# Patient Record
Sex: MALE | URBAN - METROPOLITAN AREA
[De-identification: names, ages, dates, MRNs, and addresses within clinical notes are randomized per-mention and may not be internally consistent; named-entity substitution may affect disease eponyms.]

---

## 2023-02-17 ENCOUNTER — OFFICE (OUTPATIENT)
Dept: URBAN - METROPOLITAN AREA CLINIC 105 | Facility: CLINIC | Age: 58
Setting detail: OPHTHALMOLOGY
End: 2023-02-17
Payer: COMMERCIAL

## 2023-02-17 DIAGNOSIS — H47.611: ICD-10-CM

## 2023-02-17 DIAGNOSIS — H53.001: ICD-10-CM

## 2023-02-17 DIAGNOSIS — H52.7: ICD-10-CM

## 2023-02-17 PROCEDURE — 92004 COMPRE OPH EXAM NEW PT 1/>: CPT | Performed by: OPHTHALMOLOGY

## 2023-02-17 PROCEDURE — 92015 DETERMINE REFRACTIVE STATE: CPT | Performed by: OPHTHALMOLOGY

## 2023-02-17 PROCEDURE — 92083 EXTENDED VISUAL FIELD XM: CPT | Performed by: OPHTHALMOLOGY

## 2023-02-17 ASSESSMENT — AXIALLENGTH_DERIVED
OS_AL: 4.86
OD_AL: 23.5849

## 2023-02-17 ASSESSMENT — REFRACTION_AUTOREFRACTION
OD_AXIS: 048
OS_AXIS: 163
OD_SPHERE: +3.25
OS_SPHERE: +0.75
OD_CYLINDER: -1.75
OS_CYLINDER: -1.25

## 2023-02-17 ASSESSMENT — REFRACTION_MANIFEST
OD_SPHERE: PLANO
OS_ADD: +2.25
OS_SPHERE: PLANO
OS_CYLINDER: SPHERE
OD_CYLINDER: SPHERE
OD_ADD: +2.25

## 2023-02-17 ASSESSMENT — KERATOMETRY
OS_K2POWER_DIOPTERS: 541.50
OS_AXISANGLE_DEGREES: 085
OD_K2POWER_DIOPTERS: 42.00
OS_K1POWER_DIOPTERS: 40.05
OD_K1POWER_DIOPTERS: 40.00
OD_AXISANGLE_DEGREES: 123

## 2023-02-17 ASSESSMENT — SPHEQUIV_DERIVED
OD_SPHEQUIV: 2.375
OS_SPHEQUIV: 0.125

## 2023-02-17 ASSESSMENT — VISUAL ACUITY
OD_BCVA: 20/20
OS_BCVA: 20/300

## 2023-02-17 ASSESSMENT — CONFRONTATIONAL VISUAL FIELD TEST (CVF)
OD_FINDINGS: CONSTRICTION
OS_FINDINGS: FULL

## 2023-04-03 ENCOUNTER — APPOINTMENT (OUTPATIENT)
Dept: ORTHOPEDIC SURGERY | Facility: CLINIC | Age: 58
End: 2023-04-03
Payer: MEDICARE

## 2023-04-03 VITALS — WEIGHT: 175 LBS | HEIGHT: 67 IN | BODY MASS INDEX: 27.47 KG/M2

## 2023-04-03 DIAGNOSIS — M16.10 UNILATERAL PRIMARY OSTEOARTHRITIS, UNSPECIFIED HIP: ICD-10-CM

## 2023-04-03 DIAGNOSIS — Z00.00 ENCOUNTER FOR GENERAL ADULT MEDICAL EXAMINATION W/OUT ABNORMAL FINDINGS: ICD-10-CM

## 2023-04-03 PROCEDURE — 99204 OFFICE O/P NEW MOD 45 MIN: CPT

## 2023-04-03 PROCEDURE — 73502 X-RAY EXAM HIP UNI 2-3 VIEWS: CPT

## 2023-04-09 PROBLEM — Z00.00 ENCOUNTER FOR PREVENTIVE HEALTH EXAMINATION: Status: ACTIVE | Noted: 2023-03-08

## 2023-04-09 NOTE — DISCUSSION/SUMMARY
[de-identified] : The natural progression of osteoarthritis was explained to the patient.  We discussed the possible treatment options from conservative to operative.  These included NSAIDS, Glucosamine and Chondrotin sulfate, and Physical Therapy as well different types of injections.  We also discussed that at some point they may progress to needed a EMERITA.  Information and pamphlets were given.\par \par We discussed their diagnosis and treatment options at length including surgical and non-surgical options. We will first attempt conservative treatment with activity modification, PT, icing, weight loss, and anti-inflammatory medications. We discussed the possible of injections (steroid and viscosupplementation) in the future. The patient was provided with a PT prescription to work on ROM, hip ER/abductors strengthening, quad/hamstring stretches and strengthening, and other exercises on the Hip Arthritis Protocol.\par \par Dx / Natural History:\par The patient was advised of the diagnosis.  The natural history of the pathology was explained in full to the patient in layman's terms.  Several different treatment options were discussed and explained in full to the patient including the risks and benefits of both surgical and non-surgical treatments.  All questions and concerns were answered. \par \par Pain Guide Activities:\par The patient was advised to let pain guide the gradual advancement of activities.\par \par Physical Therapy:\par The patient was provided with a prescription for Physical Therapy.\par \par Icing:\par The patient was advised to apply ice (wrapped in a towel or protective covering) to the area daily (20 minutes at a time, 2-4X/day).\par \par Follow up in 4-6 weeks to re-evaluate.

## 2023-04-09 NOTE — IMAGING
[de-identified] : The patient is a well appearing 57 year man . \par \par Patient ambulates with a antalgic gait. \par \par Pelvis: \par \par Symphysis pubis: Stable, no tenderness to palpation \par \par Palpable Hernias/Masses: None \par Resisted Sit Up: Negative \par \par  \par Right Hip/Groin/Thigh: \par  \par ROM: \par     Flexion: 0-100 degrees \par     Extension: 0-10 degrees \par     ABduction: 0-15 degrees \par     Adduction: 0-15 degrees \par     External Rotation: 0-15 degrees \par     Internal Rotation: 0 degrees \par \par Pain with range of motion in all planes\par \par PROVOCATIVE TESTING: \par      HUNG TST: Negative \par      NEHAL'S TST: Positive \par      PIRIFORMIS TST: Negative \par      Straight Leg Raise:  Negative \par      Seated Straight Leg Raise: Negative \par      IMPINGEMENT TST: Positive \par      Resisted ADduction : Negative \par PALPATION: \par         ASIS: Nontender \par         AIIS: Nontender \par         Greater Trochanter/IT-Band: TTP \par         Illiac Crest: Nontender \par         Ischial Tuberosity: Nontender \par         Hip Flexor: Nontender \par         Quadriceps: Nontender \par         Proximal Hamstring Origin: Nontender \par         Proximal Hamstring Muscle-Tendon Junction: Nontender \par         Hamstring Muscle Belly: Nontender \par         ADductor :  Nontender  \par         Lower Rectus Abdominis:  Nontender \par INSPECTION: \par         Deformity: No  \par         Erythema: No \par         Ecchymosis: No \par         Abrasions: No \par         Effusion: No \par         Groin mass/bulge: No \par         Palpable Hernia: No \par NEUROLOGIC EXAM: \par         Sensation L2-S1: Grossly Intact \par MOTOR EXAM: \par         Quadriceps: 5 out of 5 \par         Hamstrings: 5 out of 5 \par         ABduction: 5 out of 5 \par         ADduction: 5 out of 5 \par         Hip Flexion: 5 out of 5 \par         TA: 5 out of 5 \par         GS: 5 out of 5 \par Circulatory/Pulses: \par         Dorsalis Pedis:      2+ \par         Posterior Tibialis: 2+ \par  \par Left Hip/Groin/Thigh: \par  \par ROM: \par     Flexion: 0-100 degrees \par     Extension: 0-10 degrees \par     ABduction: 0-15 degrees \par     Adduction: 0-15 degrees \par     External Rotation: 0-15 degrees \par     Internal Rotation: 0 degrees \par \par Pain with range of motion in all planes\par \par PROVOCATIVE TESTING: \par      HUNG TST: Negative \par      NEHAL'S TST: Positive \par      PIRIFORMIS TST: Negative \par      Straight Leg Raise:  Negative \par      Seated Straight Leg Raise: Negative \par      IMPINGEMENT TST: Positive \par      Resisted ADduction : Negative \par PALPATION: \par         ASIS: Nontender \par         AIIS: Nontender \par         Greater Trochanter/IT-Band: TTP \par         Illiac Crest: Nontender \par         Ischial Tuberosity: Nontender \par         Hip Flexor: Nontender \par         Quadriceps: Nontender \par         Proximal Hamstring Origin: Nontender \par         Proximal Hamstring Muscle-Tendon Junction: Nontender \par         Hamstring Muscle Belly: Nontender \par         ADductor :  Nontender  \par         Lower Rectus Abdominis:  Nontender \par INSPECTION: \par         Deformity: No  \par         Erythema: No \par         Ecchymosis: No \par         Abrasions: No \par         Effusion: No \par         Groin mass/bulge: No \par         Palpable Hernia: No \par NEUROLOGIC EXAM: \par         Sensation L2-S1: Grossly Intact \par MOTOR EXAM: \par         Quadriceps: 5 out of 5 \par         Hamstrings: 5 out of 5 \par         ABduction: 5 out of 5 \par         ADduction: 5 out of 5 \par         Hip Flexion: 5 out of 5 \par         TA: 5 out of 5 \par         GS: 5 out of 5 \par Circulatory/Pulses: \par         Dorsalis Pedis:      2+ \par         Posterior Tibialis: 2+  [Right] : right hip with pelvis [There are no fractures, subluxations or dislocations. No significant abnormalities are seen] : There are no fractures, subluxations or dislocations. No significant abnormalities are seen [Moderate arthritis (Tonnis Grade 2)] : Moderate arthritis (Tonnis Grade 2)
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
## 2023-07-20 ENCOUNTER — APPOINTMENT (OUTPATIENT)
Dept: FAMILY MEDICINE | Facility: CLINIC | Age: 58
End: 2023-07-20

## 2023-09-02 ENCOUNTER — RX RENEWAL (OUTPATIENT)
Age: 58
End: 2023-09-02

## 2023-09-02 RX ORDER — DICLOFENAC SODIUM 75 MG/1
75 TABLET, DELAYED RELEASE ORAL TWICE DAILY
Qty: 60 | Refills: 2 | Status: ACTIVE | COMMUNITY
Start: 2023-04-03 | End: 1900-01-01